# Patient Record
Sex: MALE | Race: OTHER | NOT HISPANIC OR LATINO | URBAN - METROPOLITAN AREA
[De-identification: names, ages, dates, MRNs, and addresses within clinical notes are randomized per-mention and may not be internally consistent; named-entity substitution may affect disease eponyms.]

---

## 2017-11-09 PROBLEM — Z00.00 ENCOUNTER FOR PREVENTIVE HEALTH EXAMINATION: Status: ACTIVE | Noted: 2017-11-09

## 2017-11-10 ENCOUNTER — OUTPATIENT (OUTPATIENT)
Dept: OUTPATIENT SERVICES | Facility: HOSPITAL | Age: 57
LOS: 1 days | End: 2017-11-10
Payer: COMMERCIAL

## 2017-11-10 ENCOUNTER — APPOINTMENT (OUTPATIENT)
Dept: ULTRASOUND IMAGING | Facility: CLINIC | Age: 57
End: 2017-11-10
Payer: COMMERCIAL

## 2017-11-10 DIAGNOSIS — Z00.8 ENCOUNTER FOR OTHER GENERAL EXAMINATION: ICD-10-CM

## 2017-11-10 PROCEDURE — 76770 US EXAM ABDO BACK WALL COMP: CPT

## 2017-11-10 PROCEDURE — 76770 US EXAM ABDO BACK WALL COMP: CPT | Mod: 26

## 2018-12-24 ENCOUNTER — TRANSCRIPTION ENCOUNTER (OUTPATIENT)
Age: 58
End: 2018-12-24

## 2018-12-24 ENCOUNTER — INPATIENT (INPATIENT)
Facility: HOSPITAL | Age: 58
LOS: 0 days | Discharge: HOME | DRG: 629 | End: 2018-12-25
Attending: FAMILY MEDICINE | Admitting: FAMILY MEDICINE
Payer: COMMERCIAL

## 2018-12-24 VITALS
TEMPERATURE: 98 F | WEIGHT: 162.04 LBS | HEART RATE: 58 BPM | HEIGHT: 66 IN | SYSTOLIC BLOOD PRESSURE: 144 MMHG | OXYGEN SATURATION: 100 % | RESPIRATION RATE: 18 BRPM | DIASTOLIC BLOOD PRESSURE: 71 MMHG

## 2018-12-24 DIAGNOSIS — E11.9 TYPE 2 DIABETES MELLITUS WITHOUT COMPLICATIONS: ICD-10-CM

## 2018-12-24 DIAGNOSIS — Z29.9 ENCOUNTER FOR PROPHYLACTIC MEASURES, UNSPECIFIED: ICD-10-CM

## 2018-12-24 DIAGNOSIS — E11.40 TYPE 2 DIABETES MELLITUS WITH DIABETIC NEUROPATHY, UNSPECIFIED: ICD-10-CM

## 2018-12-24 DIAGNOSIS — E87.6 HYPOKALEMIA: ICD-10-CM

## 2018-12-24 DIAGNOSIS — N18.4 CHRONIC KIDNEY DISEASE, STAGE 4 (SEVERE): ICD-10-CM

## 2018-12-24 DIAGNOSIS — I10 ESSENTIAL (PRIMARY) HYPERTENSION: ICD-10-CM

## 2018-12-24 DIAGNOSIS — I25.10 ATHEROSCLEROTIC HEART DISEASE OF NATIVE CORONARY ARTERY WITHOUT ANGINA PECTORIS: ICD-10-CM

## 2018-12-24 DIAGNOSIS — Z41.9 ENCOUNTER FOR PROCEDURE FOR PURPOSES OTHER THAN REMEDYING HEALTH STATE, UNSPECIFIED: Chronic | ICD-10-CM

## 2018-12-24 DIAGNOSIS — I77.0 ARTERIOVENOUS FISTULA, ACQUIRED: Chronic | ICD-10-CM

## 2018-12-24 DIAGNOSIS — R06.02 SHORTNESS OF BREATH: ICD-10-CM

## 2018-12-24 DIAGNOSIS — Z95.9 PRESENCE OF CARDIAC AND VASCULAR IMPLANT AND GRAFT, UNSPECIFIED: Chronic | ICD-10-CM

## 2018-12-24 LAB
ALBUMIN SERPL ELPH-MCNC: 2.8 G/DL — LOW (ref 3.3–5)
ALP SERPL-CCNC: 66 U/L — SIGNIFICANT CHANGE UP (ref 30–120)
ALT FLD-CCNC: 22 U/L DA — SIGNIFICANT CHANGE UP (ref 10–60)
ANION GAP SERPL CALC-SCNC: 13 MMOL/L — SIGNIFICANT CHANGE UP (ref 5–17)
AST SERPL-CCNC: 20 U/L — SIGNIFICANT CHANGE UP (ref 10–40)
BILIRUB SERPL-MCNC: 0.5 MG/DL — SIGNIFICANT CHANGE UP (ref 0.2–1.2)
BUN SERPL-MCNC: 62 MG/DL — HIGH (ref 7–23)
CALCIUM SERPL-MCNC: 8.9 MG/DL — SIGNIFICANT CHANGE UP (ref 8.4–10.5)
CHLORIDE SERPL-SCNC: 103 MMOL/L — SIGNIFICANT CHANGE UP (ref 96–108)
CO2 SERPL-SCNC: 26 MMOL/L — SIGNIFICANT CHANGE UP (ref 22–31)
CREAT SERPL-MCNC: 3.45 MG/DL — HIGH (ref 0.5–1.3)
GLUCOSE BLDC GLUCOMTR-MCNC: 70 MG/DL — SIGNIFICANT CHANGE UP (ref 70–99)
GLUCOSE BLDC GLUCOMTR-MCNC: 79 MG/DL — SIGNIFICANT CHANGE UP (ref 70–99)
GLUCOSE SERPL-MCNC: 84 MG/DL — SIGNIFICANT CHANGE UP (ref 70–99)
MAGNESIUM SERPL-MCNC: 2 MG/DL — SIGNIFICANT CHANGE UP (ref 1.6–2.6)
POTASSIUM SERPL-MCNC: 2.8 MMOL/L — CRITICAL LOW (ref 3.5–5.3)
POTASSIUM SERPL-SCNC: 2.8 MMOL/L — CRITICAL LOW (ref 3.5–5.3)
PROT SERPL-MCNC: 6.9 G/DL — SIGNIFICANT CHANGE UP (ref 6–8.3)
SODIUM SERPL-SCNC: 142 MMOL/L — SIGNIFICANT CHANGE UP (ref 135–145)

## 2018-12-24 PROCEDURE — 99284 EMERGENCY DEPT VISIT MOD MDM: CPT

## 2018-12-24 PROCEDURE — 71046 X-RAY EXAM CHEST 2 VIEWS: CPT | Mod: 26

## 2018-12-24 PROCEDURE — 93010 ELECTROCARDIOGRAM REPORT: CPT | Mod: 76

## 2018-12-24 PROCEDURE — 99223 1ST HOSP IP/OBS HIGH 75: CPT

## 2018-12-24 RX ORDER — DEXTROSE 50 % IN WATER 50 %
25 SYRINGE (ML) INTRAVENOUS ONCE
Qty: 0 | Refills: 0 | Status: DISCONTINUED | OUTPATIENT
Start: 2018-12-24 | End: 2018-12-25

## 2018-12-24 RX ORDER — INSULIN LISPRO 100/ML
VIAL (ML) SUBCUTANEOUS
Qty: 0 | Refills: 0 | Status: DISCONTINUED | OUTPATIENT
Start: 2018-12-24 | End: 2018-12-25

## 2018-12-24 RX ORDER — GLUCAGON INJECTION, SOLUTION 0.5 MG/.1ML
1 INJECTION, SOLUTION SUBCUTANEOUS ONCE
Qty: 0 | Refills: 0 | Status: DISCONTINUED | OUTPATIENT
Start: 2018-12-24 | End: 2018-12-25

## 2018-12-24 RX ORDER — DEXTROSE 50 % IN WATER 50 %
12.5 SYRINGE (ML) INTRAVENOUS ONCE
Qty: 0 | Refills: 0 | Status: DISCONTINUED | OUTPATIENT
Start: 2018-12-24 | End: 2018-12-25

## 2018-12-24 RX ORDER — INSULIN LISPRO 100/ML
VIAL (ML) SUBCUTANEOUS AT BEDTIME
Qty: 0 | Refills: 0 | Status: DISCONTINUED | OUTPATIENT
Start: 2018-12-24 | End: 2018-12-25

## 2018-12-24 RX ORDER — INSULIN GLARGINE 100 [IU]/ML
0 INJECTION, SOLUTION SUBCUTANEOUS
Qty: 0 | Refills: 0 | COMMUNITY

## 2018-12-24 RX ORDER — POTASSIUM CHLORIDE 20 MEQ
10 PACKET (EA) ORAL ONCE
Qty: 0 | Refills: 0 | Status: COMPLETED | OUTPATIENT
Start: 2018-12-24 | End: 2018-12-24

## 2018-12-24 RX ORDER — SODIUM CHLORIDE 9 MG/ML
1000 INJECTION, SOLUTION INTRAVENOUS
Qty: 0 | Refills: 0 | Status: DISCONTINUED | OUTPATIENT
Start: 2018-12-24 | End: 2018-12-25

## 2018-12-24 RX ORDER — DEXTROSE 50 % IN WATER 50 %
15 SYRINGE (ML) INTRAVENOUS ONCE
Qty: 0 | Refills: 0 | Status: DISCONTINUED | OUTPATIENT
Start: 2018-12-24 | End: 2018-12-25

## 2018-12-24 RX ORDER — GABAPENTIN 400 MG/1
0 CAPSULE ORAL
Qty: 0 | Refills: 0 | COMMUNITY

## 2018-12-24 RX ORDER — ROSUVASTATIN CALCIUM 5 MG/1
0 TABLET ORAL
Qty: 0 | Refills: 0 | COMMUNITY

## 2018-12-24 RX ORDER — POTASSIUM CHLORIDE 20 MEQ
40 PACKET (EA) ORAL ONCE
Qty: 0 | Refills: 0 | Status: COMPLETED | OUTPATIENT
Start: 2018-12-24 | End: 2018-12-24

## 2018-12-24 RX ORDER — INSULIN GLARGINE 100 [IU]/ML
20 INJECTION, SOLUTION SUBCUTANEOUS ONCE
Qty: 0 | Refills: 0 | Status: COMPLETED | OUTPATIENT
Start: 2018-12-24 | End: 2018-12-24

## 2018-12-24 RX ORDER — INSULIN GLARGINE 100 [IU]/ML
40 INJECTION, SOLUTION SUBCUTANEOUS AT BEDTIME
Qty: 0 | Refills: 0 | Status: DISCONTINUED | OUTPATIENT
Start: 2018-12-24 | End: 2018-12-25

## 2018-12-24 RX ADMIN — Medication 40 MILLIEQUIVALENT(S): at 18:42

## 2018-12-24 RX ADMIN — Medication 40 MILLIEQUIVALENT(S): at 23:16

## 2018-12-24 RX ADMIN — SODIUM CHLORIDE 50 MILLILITER(S): 9 INJECTION, SOLUTION INTRAVENOUS at 15:18

## 2018-12-24 RX ADMIN — SODIUM CHLORIDE 50 MILLILITER(S): 9 INJECTION, SOLUTION INTRAVENOUS at 19:48

## 2018-12-24 RX ADMIN — INSULIN GLARGINE 20 UNIT(S): 100 INJECTION, SOLUTION SUBCUTANEOUS at 23:25

## 2018-12-24 RX ADMIN — Medication 100 MILLIEQUIVALENT(S): at 14:40

## 2018-12-24 RX ADMIN — Medication 100 MILLIEQUIVALENT(S): at 18:49

## 2018-12-24 RX ADMIN — Medication 10 MILLIEQUIVALENT(S): at 16:50

## 2018-12-24 NOTE — ED PROVIDER NOTE - CARE PLAN
Principal Discharge DX:	Hypokalemia  Secondary Diagnosis:	Retinal detachment of left eye without retinal defect

## 2018-12-24 NOTE — H&P ADULT - PROBLEM SELECTOR PLAN 5
Stable, last nuclear stress test was in august 2018, no perfusion defect, will continue on metoprolol as Nebivolol is non formulary, in addition to Aspirin & statin. Cardiology consult as stated above.

## 2018-12-24 NOTE — H&P ADULT - ASSESSMENT
57 y/o M with PMH of HTN, DM type 2 with Diabetic Neuropathy, Dyslipidemia, CAD s/p MI s/p PCI 10 years ago s/p CABG, Stage 4 CKD s/p left A-V fistula placement, and testicular Dysfunction who presented with pre-op low plasma potassium level. 59 y/o M with PMH of HTN, DM type 2 with Diabetic Neuropathy, Dyslipidemia, CAD s/p MI s/p PCI 10 years ago s/p CABG, Stage 4 CKD s/p left A-V fistula placement, and testicular Dysfunction who presented with pre-op low serum potassium level.

## 2018-12-24 NOTE — ED ADULT NURSE NOTE - CHPI ED NUR SYMPTOMS NEG
no headache/no pain/no vomiting/no dizziness/no fever/no loss of consciousness/no nausea/no back pain/no chills/no decreased eating/drinking

## 2018-12-24 NOTE — H&P ADULT - ATTENDING COMMENTS
Medical management plan was discussed with patient at the bedside, he understands & agrees. Medical management plan was discussed with patient at the bedside, he understands & agrees. Patient is medically optimized for low risk surgery pending am labs, can proceed with retinal detachment repair after cardiology clearance.

## 2018-12-24 NOTE — H&P ADULT - PROBLEM SELECTOR PLAN 4
s/p post AV fistula, already matured, already on transplant list, didn't start HD, will monitor, renal dosing of meds, avoid nephrotoxins.

## 2018-12-24 NOTE — H&P ADULT - HISTORY OF PRESENT ILLNESS
This is a 57 y/o M with PMH of HTN, DM type 2 with Diabetic Neuropathy, Dyslipidemia, CAD s/p MI s/p PCI 10 years ago s/p CABG, Stage 4 CKD s/p left A-V fistula placement, and testicular Dysfunction who presented with low plasma potassium level. As per patient, he had acute diminution of his left eye vision 2 days ago, saw his ophthalmologist today who scheduled him for same day for surgical repair of his left retinal Detachment. Patient saw his PMD today who cleared him for the surgery after an EKG was done, no blood work, but the anesthesiologist ordered BMP, given his kidney problem, that revealed hypokalemia of 2.6 meq/L, so surgery was rescheduled, and patient was sent to ED for admission to the hospital for correction of hypokalemia, and monitoring of his electrolytes. Patient denies any palpitations, no chest pain, muscle cramps, or dizziness, but reports chronic SOB on mild effort for which he was on Lasix 80 mg PO BID for long time.

## 2018-12-24 NOTE — ED PROVIDER NOTE - OBJECTIVE STATEMENT
59 y/o M pt with hx of DM, renal failure, MI, HTN, CAD, and CABG presents to the ED c/o low potassium levels today. He states that he was having blurry vision for 3 days. Pt was seen by his ophthalmologist this morning and was diagnosed with detached retina in the left eye. Pt was scheduled for a retinal detachment repair surgery today and was sent to the ED for further evaluation. The surgery has been rescheduled as per the staff in the OR. He had his medical clearance at his pmd this morning and blood work done at the hospital. Pt has renal failure, but has not started dialysis yet. Denies fever, chills, or chest pain. No other complaints at this time.     Dr. Alex Christopher- ophthalmologist  Dr. sultana jo- pmd 57 y/o M pt with hx of DM, renal failure, MI, HTN, CAD, and CABG presents to the ED c/o low potassium levels today. He states that he was having blurry vision for 3 days. Pt was seen by his ophthalmologist this morning and was diagnosed with detached retina in the left eye. Pt was scheduled for a retinal detachment repair surgery today and was sent to the ED for further evaluation. The surgery has been rescheduled as per the staff in the OR. He had his medical clearance at his pmd this morning and blood work done at the hospital. Pt has renal failure, but has not started dialysis yet. Denies fever, chills, or chest pain. No other complaints at this time.     Dr. Alex Banks- ophthalmologist  Dr. sultana jo- pmd

## 2018-12-24 NOTE — H&P ADULT - NSHPLABSRESULTS_GEN_ALL_CORE
-             12-24    142  |  103  |  62<H>  ----------------------------<  84  2.8<LL>   |  26  |  3.45<H>    Ca    8.9      24 Dec 2018 17:49  Mg     2.0     12-24    TPro  6.9  /  Alb  2.8<L>  /  TBili  0.5  /  DBili  x   /  AST  20  /  ALT  22  /  AlkPhos  66  12-24            CAPILLARY BLOOD GLUCOSE  POCT Blood Glucose.: 200 mg/dL (24 Dec 2018 19:53)        CXR:    As per my review shows s/p mid sternotomy, enlarged cardiac shadow size, pulmonary venous congestion, no pulmonary infiltrates, pleural effusion, or pneumothorax. Pending official report.          EKG:    As per my review shows SR at 75/min, normal CA & QTc intervals, possible old anterior MI, normal QRS voltage, duration, and axis (+90), with delayed transition, lateral leads ST-T abnormality.  No change from previous EKG done this am.    -

## 2018-12-24 NOTE — H&P ADULT - NSHPPHYSICALEXAM_GEN_ALL_CORE
-    Vital Signs Last 24 Hrs  T(C): 36.7 (24 Dec 2018 19:48), Max: 36.7 (24 Dec 2018 19:48)  T(F): 98 (24 Dec 2018 19:48), Max: 98 (24 Dec 2018 19:48)  HR: 67 (24 Dec 2018 19:48) (58 - 67)  BP: 152/70 (24 Dec 2018 19:48) (134/78 - 152/70)  BP(mean): --  RR: 18 (24 Dec 2018 19:48) (16 - 18)  SpO2: 99% (24 Dec 2018 19:48) (99% - 100%)          PHYSICAL EXAM:    GENERAL: NAD, well-groomed, well-developed.  HEAD:  Atraumatic, Norm cephalic.  EYES: B/L mydiasis, (+) B/L IO lens, conjunctiva clear.  ENMT: no nasal discharge, no juan-pharyngeal erythema or exudates, MMM.   NECK: Supple, No JVD.  NERVOUS SYSTEM:  Alert & oriented X3, neurologically intact grossly.  CHEST/LUNG: Fair air entry B/L, (+) B/L lower lung zone rales, no rhonchi, or wheezing.  HEART: Normal S1 & S2, grad I/VI DONNA over 1st aortic area without propagation, no extra sounds.  ABDOMEN: Soft, non-tender, non-distended; bowel sounds present, no palpable masses or organomegaly.  EXTREMITIES:  No clubbing, or cyanosis, (+) mild B/L ankle edema, (+) left radial AV fistula.  VASCULAR: 2+ radial, DPA / PTA pulses B/L.  SKIN: No rashes or lesions.  PSYCH: normal affect & behavior.

## 2018-12-24 NOTE — H&P ADULT - PROBLEM SELECTOR PLAN 2
with CXR shows cardiomegaly & PVC, will continue Furosemide, cardiology consult with Dr. Dillon was called.

## 2018-12-24 NOTE — ED PROVIDER NOTE - PSH
A-V fistula  Left upper arm  Elective surgery  continous glucose monitor right upper arm  S/P angioplasty with stent  x 2 stents

## 2018-12-24 NOTE — ED PROVIDER NOTE - PMH
CAD (coronary artery disease)    CKD (chronic kidney disease), stage IV    Diabetic neuropathy    DM (diabetes mellitus)    HTN (hypertension)    Myocardial infarction    Testicular hypofunction

## 2018-12-24 NOTE — H&P ADULT - PROBLEM SELECTOR PLAN 1
Potassium of 2.6 meq/L this am, ML 2ry to high dose loop diuretic that patient is on for long without potassium supplements, no hypokalemia related EKG changes, received 10 meq IVPB X1 dose earlier by ED team, repeat potassium was the same, received another dose in addition to 40 meq of potassium chloride X1 PO, will give another dose of 40 meq PO X1 at 11 pm, and res test serum potassium level in am.

## 2018-12-24 NOTE — H&P ADULT - PROBLEM SELECTOR PLAN 3
controlled, patient is going to be NPO status after midnight, will give only 20 units of Lantus for tonight, then resume the home dose equivalent starting tomorrow at bedtime, in addition to corrective insulin Lispro coverage scale before meals & at bedtime, will check glycohemoglobin level in am.

## 2018-12-24 NOTE — H&P ADULT - NSHPREVIEWOFSYSTEMS_GEN_ALL_CORE
-    CONSTITUTIONAL: No fever, weight loss, or fatigue.  EYES: No eye pain, (+) diminution of his left eye vision, no discharge.  ENMT:  No difficulty hearing, tinnitus, vertigo; No sinus or throat pain.  NECK: No pain or stiffness.  RESPIRATORY: No cough, wheezing, or hemoptysis; (+)  shortness of breath on mild effort.  CARDIOVASCULAR: No chest pain, palpitations, or dizziness, (+) B/L leg swelling.  GASTROINTESTINAL: No abdominal pain, no nausea, vomiting, or hematemesis; No diarrhea or Change in bowel habits. No melena or hematochezia.  GENITOURINARY: No dysuria, frequency, hematuria, or incontinence.  NEUROLOGICAL: No headaches, focal muscle weakness, numbness, or tremors.  SKIN: No itching, burning or rashes.  MUSCULOSKELETAL: No joint swelling or pain.  PSYCHIATRIC: No depression, anxiety, or agitation.  HEME/LYMPH: No easy bruising, bleeding gums, or nose bleed.  ALLERGY AND IMMUNOLOGIC: No hives or eczema.

## 2018-12-25 ENCOUNTER — TRANSCRIPTION ENCOUNTER (OUTPATIENT)
Age: 58
End: 2018-12-25

## 2018-12-25 VITALS
SYSTOLIC BLOOD PRESSURE: 157 MMHG | TEMPERATURE: 98 F | HEART RATE: 63 BPM | RESPIRATION RATE: 18 BRPM | DIASTOLIC BLOOD PRESSURE: 85 MMHG | OXYGEN SATURATION: 98 %

## 2018-12-25 LAB
ANION GAP SERPL CALC-SCNC: 12 MMOL/L — SIGNIFICANT CHANGE UP (ref 5–17)
BASOPHILS # BLD AUTO: 0.06 K/UL — SIGNIFICANT CHANGE UP (ref 0–0.2)
BASOPHILS NFR BLD AUTO: 0.8 % — SIGNIFICANT CHANGE UP (ref 0–2)
BUN SERPL-MCNC: 64 MG/DL — HIGH (ref 7–23)
CALCIUM SERPL-MCNC: 9.4 MG/DL — SIGNIFICANT CHANGE UP (ref 8.4–10.5)
CHLORIDE SERPL-SCNC: 107 MMOL/L — SIGNIFICANT CHANGE UP (ref 96–108)
CO2 SERPL-SCNC: 24 MMOL/L — SIGNIFICANT CHANGE UP (ref 22–31)
CREAT SERPL-MCNC: 3.46 MG/DL — HIGH (ref 0.5–1.3)
EOSINOPHIL # BLD AUTO: 0.31 K/UL — SIGNIFICANT CHANGE UP (ref 0–0.5)
EOSINOPHIL NFR BLD AUTO: 4 % — SIGNIFICANT CHANGE UP (ref 0–6)
GLUCOSE SERPL-MCNC: 126 MG/DL — HIGH (ref 70–99)
HBA1C BLD-MCNC: 9.5 % — HIGH (ref 4–5.6)
HCT VFR BLD CALC: 33.5 % — LOW (ref 39–50)
HCV AB S/CO SERPL IA: 0.09 S/CO — SIGNIFICANT CHANGE UP
HCV AB SERPL-IMP: SIGNIFICANT CHANGE UP
HGB BLD-MCNC: 10.6 G/DL — LOW (ref 13–17)
IMM GRANULOCYTES NFR BLD AUTO: 0.3 % — SIGNIFICANT CHANGE UP (ref 0–1.5)
LYMPHOCYTES # BLD AUTO: 1.74 K/UL — SIGNIFICANT CHANGE UP (ref 1–3.3)
LYMPHOCYTES # BLD AUTO: 22.7 % — SIGNIFICANT CHANGE UP (ref 13–44)
MCHC RBC-ENTMCNC: 27.2 PG — SIGNIFICANT CHANGE UP (ref 27–34)
MCHC RBC-ENTMCNC: 31.6 GM/DL — LOW (ref 32–36)
MCV RBC AUTO: 85.9 FL — SIGNIFICANT CHANGE UP (ref 80–100)
MONOCYTES # BLD AUTO: 0.63 K/UL — SIGNIFICANT CHANGE UP (ref 0–0.9)
MONOCYTES NFR BLD AUTO: 8.2 % — SIGNIFICANT CHANGE UP (ref 2–14)
NEUTROPHILS # BLD AUTO: 4.91 K/UL — SIGNIFICANT CHANGE UP (ref 1.8–7.4)
NEUTROPHILS NFR BLD AUTO: 64 % — SIGNIFICANT CHANGE UP (ref 43–77)
NT-PROBNP SERPL-SCNC: 6014 PG/ML — HIGH (ref 0–125)
PLATELET # BLD AUTO: 224 K/UL — SIGNIFICANT CHANGE UP (ref 150–400)
POTASSIUM SERPL-MCNC: 3.9 MMOL/L — SIGNIFICANT CHANGE UP (ref 3.5–5.3)
POTASSIUM SERPL-SCNC: 3.9 MMOL/L — SIGNIFICANT CHANGE UP (ref 3.5–5.3)
RBC # BLD: 3.9 M/UL — LOW (ref 4.2–5.8)
RBC # FLD: 17.1 % — HIGH (ref 10.3–14.5)
SODIUM SERPL-SCNC: 143 MMOL/L — SIGNIFICANT CHANGE UP (ref 135–145)
WBC # BLD: 7.67 K/UL — SIGNIFICANT CHANGE UP (ref 3.8–10.5)
WBC # FLD AUTO: 7.67 K/UL — SIGNIFICANT CHANGE UP (ref 3.8–10.5)

## 2018-12-25 PROCEDURE — 80048 BASIC METABOLIC PNL TOTAL CA: CPT

## 2018-12-25 PROCEDURE — 86803 HEPATITIS C AB TEST: CPT

## 2018-12-25 PROCEDURE — C1889: CPT

## 2018-12-25 PROCEDURE — 82962 GLUCOSE BLOOD TEST: CPT

## 2018-12-25 PROCEDURE — 85027 COMPLETE CBC AUTOMATED: CPT

## 2018-12-25 PROCEDURE — 99285 EMERGENCY DEPT VISIT HI MDM: CPT | Mod: 25

## 2018-12-25 PROCEDURE — 83880 ASSAY OF NATRIURETIC PEPTIDE: CPT

## 2018-12-25 PROCEDURE — 99239 HOSP IP/OBS DSCHRG MGMT >30: CPT

## 2018-12-25 PROCEDURE — 83735 ASSAY OF MAGNESIUM: CPT

## 2018-12-25 PROCEDURE — 93005 ELECTROCARDIOGRAM TRACING: CPT

## 2018-12-25 PROCEDURE — 83036 HEMOGLOBIN GLYCOSYLATED A1C: CPT

## 2018-12-25 PROCEDURE — 96365 THER/PROPH/DIAG IV INF INIT: CPT

## 2018-12-25 PROCEDURE — 71046 X-RAY EXAM CHEST 2 VIEWS: CPT

## 2018-12-25 PROCEDURE — 80053 COMPREHEN METABOLIC PANEL: CPT

## 2018-12-25 PROCEDURE — 36415 COLL VENOUS BLD VENIPUNCTURE: CPT

## 2018-12-25 RX ORDER — FUROSEMIDE 40 MG
1 TABLET ORAL
Qty: 0 | Refills: 0 | COMMUNITY

## 2018-12-25 RX ORDER — ASPIRIN/CALCIUM CARB/MAGNESIUM 324 MG
1 TABLET ORAL
Qty: 0 | Refills: 0 | COMMUNITY
Start: 2018-12-25

## 2018-12-25 RX ORDER — ASPIRIN/CALCIUM CARB/MAGNESIUM 324 MG
81 TABLET ORAL DAILY
Qty: 0 | Refills: 0 | Status: DISCONTINUED | OUTPATIENT
Start: 2018-12-25 | End: 2018-12-25

## 2018-12-25 RX ORDER — ATORVASTATIN CALCIUM 80 MG/1
40 TABLET, FILM COATED ORAL AT BEDTIME
Qty: 0 | Refills: 0 | Status: DISCONTINUED | OUTPATIENT
Start: 2018-12-25 | End: 2018-12-25

## 2018-12-25 RX ORDER — GABAPENTIN 400 MG/1
300 CAPSULE ORAL
Qty: 0 | Refills: 0 | Status: DISCONTINUED | OUTPATIENT
Start: 2018-12-25 | End: 2018-12-25

## 2018-12-25 RX ORDER — METOPROLOL TARTRATE 50 MG
25 TABLET ORAL
Qty: 0 | Refills: 0 | Status: DISCONTINUED | OUTPATIENT
Start: 2018-12-25 | End: 2018-12-25

## 2018-12-25 RX ORDER — ONDANSETRON 8 MG/1
4 TABLET, FILM COATED ORAL ONCE
Qty: 0 | Refills: 0 | Status: COMPLETED | OUTPATIENT
Start: 2018-12-25 | End: 2018-12-25

## 2018-12-25 RX ORDER — FERROUS SULFATE 325(65) MG
1 TABLET ORAL
Qty: 0 | Refills: 0 | COMMUNITY
Start: 2018-12-25

## 2018-12-25 RX ORDER — FERROUS SULFATE 325(65) MG
325 TABLET ORAL DAILY
Qty: 0 | Refills: 0 | Status: DISCONTINUED | OUTPATIENT
Start: 2018-12-25 | End: 2018-12-25

## 2018-12-25 RX ORDER — FUROSEMIDE 40 MG
80 TABLET ORAL
Qty: 0 | Refills: 0 | Status: DISCONTINUED | OUTPATIENT
Start: 2018-12-25 | End: 2018-12-25

## 2018-12-25 RX ORDER — NEBIVOLOL HYDROCHLORIDE 5 MG/1
1 TABLET ORAL
Qty: 0 | Refills: 0 | COMMUNITY

## 2018-12-25 RX ADMIN — ONDANSETRON 4 MILLIGRAM(S): 8 TABLET, FILM COATED ORAL at 11:40

## 2018-12-25 RX ADMIN — Medication 80 MILLIGRAM(S): at 05:29

## 2018-12-25 RX ADMIN — Medication 25 MILLIGRAM(S): at 05:29

## 2018-12-25 RX ADMIN — Medication 81 MILLIGRAM(S): at 13:11

## 2018-12-25 RX ADMIN — Medication 325 MILLIGRAM(S): at 13:11

## 2018-12-25 RX ADMIN — GABAPENTIN 300 MILLIGRAM(S): 400 CAPSULE ORAL at 07:40

## 2018-12-25 NOTE — DISCHARGE NOTE ADULT - ADDITIONAL INSTRUCTIONS
Please follow up with opth in am for re-evaluation  Please follow up with nephrology in 2-3 days  Please come back to the hospital if you develop any shortness of breath, chest pain, or any new symptoms or concerns

## 2018-12-25 NOTE — DISCHARGE NOTE ADULT - PATIENT PORTAL LINK FT
You can access the [facility] Patient Portal, offered by [facility], by registering with the following website: [email or web address]

## 2018-12-25 NOTE — DISCHARGE NOTE ADULT - MEDICATION SUMMARY - MEDICATIONS TO CHANGE
96 I will SWITCH the dose or number of times a day I take the medications listed below when I get home from the hospital:  None

## 2018-12-25 NOTE — DISCHARGE NOTE ADULT - SECONDARY DIAGNOSIS.
CAD (coronary artery disease) CKD (chronic kidney disease), stage IV DM2 (diabetes mellitus, type 2)

## 2018-12-25 NOTE — PHARMACOTHERAPY INTERVENTION NOTE - COMMENTS
Recommended changing gabapentin from 600mg bid to 600mg once daily due to CrCl~23.  MD Torres reduced dose to 300mg bid
Suggested reducing intial Lantus Dose 80 percent from 50 units to 40 units because it was patients first dose after being admitted and they were being converted for Toujeo

## 2018-12-25 NOTE — DISCHARGE NOTE ADULT - CARE PLAN
Principal Discharge DX:	Retinal detachment, unspecified laterality  Goal:	keep head down for the next 24hrs.  follow up with opth in the am  Assessment and plan of treatment:	diabetic diet  Secondary Diagnosis:	CAD (coronary artery disease)  Goal:	continue with home medications  Secondary Diagnosis:	CKD (chronic kidney disease), stage IV  Goal:	Stable  Secondary Diagnosis:	DM2 (diabetes mellitus, type 2)  Goal:	Continue with home medications

## 2018-12-25 NOTE — DISCHARGE NOTE ADULT - HOSPITAL COURSE
57 y/o M with PMH of HTN, DM type 2 with Diabetic Neuropathy, Dyslipidemia, CAD s/p MI s/p PCI 10 years ago s/p CABG, Stage 4 CKD s/p left A-V fistula placement, and testicular Dysfunction who presented with pre-op low serum potassium level.      Problem/Plan - 1:  ·  Problem: Hypokalemia.  Resolved.    -Follow up with Nephrology in 3-4 days for re-evaluation     Problem/Plan - 2:  ·  Problem: SOB (shortness of breath).  Plan: with CXR shows cardiomegaly No acute process.  Asymptomatic.  Resolved.  Outpatient follow up with cardiology.  Discussed case with cardiology who recommended outpatient follow up with his cardiologist.      Problem/Plan - 3:  ·  Problem: DM2 (diabetes mellitus, type 2).  Continue with home medications        Problem/Plan - 4:  ·  Problem: CKD (chronic kidney disease), stage IV.  Plan: s/p post AV fistula, already matured, already on transplant list, didn't start HD, will monitor, renal dosing of meds, avoid nephrotoxins. outpatient follow up with nephrology     Problem/Plan - 5:  ·  Problem: CAD (coronary artery disease).  Plan: Stable, last nuclear stress test was in august 2018, no perfusion defect, will continue on metoprolol as Nebivolol is non formulary, in addition to Aspirin & statin. Cleared for discharge as per cardiology.  Outpatient follow up        Problem/Plan - 6:  Problem: HTN (hypertension). Plan: controlled, will continue same meds with hold parameters.     Problem/Plan - 7:  ·  Problem: Diabetic neuropathy.  Plan: on Gabapentin 600 mg PO BID, will continue.     Hospital course, and discharge planning were discussed with patient, and wife in details.  Seems to understand, and in agreement      Patient was seen and examined today  Patient reports that he feels better.  Offers no other complaints  Denies any SOB, LE, chest pain, Headache, dizziness, weakness, numbness, abdominal pain, N/V/D    Vital Signs Last 24 Hrs  T(C): 36.7 (25 Dec 2018 10:46), Max: 37 (24 Dec 2018 22:35)  T(F): 98 (25 Dec 2018 10:46), Max: 98.6 (24 Dec 2018 22:35)  HR: 66 (25 Dec 2018 11:42) (58 - 77)  BP: 159/85 (25 Dec 2018 11:42) (134/78 - 160/85)  BP(mean): --  RR: 18 (25 Dec 2018 11:42) (15 - 20)  SpO2: 97% (25 Dec 2018 11:42) (96% - 100%)    PHYSICAL EXAM-  GENERAL: NAD, chronic ill appearing male  HEAD:  Atraumatic, Normocephalic  EYES: EOMI, PERRLA, conjunctiva and sclera clear  NECK: Supple, No JVD, Normal thyroid  NERVOUS SYSTEM:  Alert & Oriented X3, Motor Strength 5/5 B/L upper and lower extremities; DTRs 2+ intact and symmetric  CHEST/LUNG: Clear to percussion bilaterally; No rales, rhonchi, wheezing, or rubs  HEART: Regular rate and rhythm; No murmurs, rubs, or gallops  ABDOMEN: Soft, Nontender, Nondistended; Bowel sounds present  EXTREMITIES:  2+ Peripheral Pulses, No clubbing, cyanosis, or edema  SKIN: No rashes or lesions

## 2018-12-25 NOTE — DISCHARGE NOTE ADULT - PLAN OF CARE
keep head down for the next 24hrs.  follow up with opth in the am diabetic diet continue with home medications Stable Continue with home medications

## 2018-12-25 NOTE — DISCHARGE NOTE ADULT - CARE PROVIDER_API CALL
Alex Banks (MD), Ophthalmology  [address] [place] [address] [id number]  Phone: [phone]  Fax: [phone]

## 2018-12-25 NOTE — DISCHARGE NOTE ADULT - MEDICATION SUMMARY - MEDICATIONS TO TAKE
I will START or STAY ON the medications listed below when I get home from the hospital:    Adult Aspirin 81 mg oral tablet, chewable  -- 1 tab(s) by mouth once a day  -- Indication: For CAD (coronary artery disease)    gabapentin 600 mg oral tablet  -- orally 2 times a day  -- Indication: For Neuropathy    Touhaleyo Max SoloStar  -- 50 units at night  -- Indication: For DM2 (diabetes mellitus, type 2)    Crestor  -- Indication: For Hld    Bystolic 10 mg oral tablet  -- 1 tab(s) by mouth once a day  -- Indication: For HTN (hypertension)    Lasix 80 mg oral tablet  -- 1 tab(s) by mouth 2 times a day  -- Indication: For Chf    ferrous sulfate 325 mg (65 mg elemental iron) oral tablet  -- 1 tab(s) by mouth once a day  -- Indication: For anemia

## 2021-03-20 ENCOUNTER — TRANSCRIPTION ENCOUNTER (OUTPATIENT)
Age: 61
End: 2021-03-20

## 2021-08-23 ENCOUNTER — TRANSCRIPTION ENCOUNTER (OUTPATIENT)
Age: 61
End: 2021-08-23

## 2022-01-02 ENCOUNTER — TRANSCRIPTION ENCOUNTER (OUTPATIENT)
Age: 62
End: 2022-01-02

## 2022-03-18 PROBLEM — E11.40 TYPE 2 DIABETES MELLITUS WITH DIABETIC NEUROPATHY, UNSPECIFIED: Chronic | Status: ACTIVE | Noted: 2018-12-24

## 2022-03-18 PROBLEM — N18.4 CHRONIC KIDNEY DISEASE, STAGE 4 (SEVERE): Chronic | Status: ACTIVE | Noted: 2018-12-24

## 2022-03-18 PROBLEM — E11.9 TYPE 2 DIABETES MELLITUS WITHOUT COMPLICATIONS: Chronic | Status: ACTIVE | Noted: 2018-12-24

## 2022-03-18 PROBLEM — I21.9 ACUTE MYOCARDIAL INFARCTION, UNSPECIFIED: Chronic | Status: ACTIVE | Noted: 2018-12-24

## 2022-03-18 PROBLEM — I25.10 ATHEROSCLEROTIC HEART DISEASE OF NATIVE CORONARY ARTERY WITHOUT ANGINA PECTORIS: Chronic | Status: ACTIVE | Noted: 2018-12-24

## 2022-03-18 PROBLEM — I10 ESSENTIAL (PRIMARY) HYPERTENSION: Chronic | Status: ACTIVE | Noted: 2018-12-24

## 2022-03-18 PROBLEM — E29.1 TESTICULAR HYPOFUNCTION: Chronic | Status: ACTIVE | Noted: 2018-12-24

## 2022-03-21 PROBLEM — Z71.9 ENCOUNTER FOR CONSULTATION: Status: ACTIVE | Noted: 2022-03-21

## 2022-03-22 RX ORDER — FUROSEMIDE 40 MG/1
40 TABLET ORAL
Refills: 0 | Status: ACTIVE | COMMUNITY
Start: 2022-03-22

## 2022-03-22 RX ORDER — TRAZODONE HYDROCHLORIDE 50 MG/1
50 TABLET ORAL DAILY
Refills: 0 | Status: ACTIVE | COMMUNITY
Start: 2022-03-22

## 2022-03-22 RX ORDER — TAMSULOSIN HYDROCHLORIDE 0.4 MG/1
0.4 CAPSULE ORAL DAILY
Refills: 0 | Status: ACTIVE | COMMUNITY
Start: 2022-03-22

## 2022-03-22 RX ORDER — INSULIN ASPART 100 [IU]/ML
100 INJECTION, SOLUTION INTRAVENOUS; SUBCUTANEOUS DAILY
Refills: 0 | Status: ACTIVE | COMMUNITY
Start: 2022-03-22

## 2022-03-22 RX ORDER — FLUDROCORTISONE ACETATE 0.1 MG/1
0.1 TABLET ORAL
Refills: 0 | Status: ACTIVE | COMMUNITY
Start: 2022-03-22

## 2022-03-22 RX ORDER — CLOPIDOGREL 75 MG/1
75 TABLET, FILM COATED ORAL DAILY
Qty: 30 | Refills: 0 | Status: ACTIVE | COMMUNITY
Start: 2022-03-22

## 2022-03-22 RX ORDER — ROSUVASTATIN CALCIUM 20 MG/1
20 TABLET, FILM COATED ORAL DAILY
Qty: 30 | Refills: 2 | Status: ACTIVE | COMMUNITY
Start: 2022-03-22

## 2022-03-22 RX ORDER — ASPIRIN 81 MG/1
81 TABLET, COATED ORAL
Refills: 0 | Status: ACTIVE | COMMUNITY
Start: 2022-03-22

## 2022-03-22 RX ORDER — RIVAROXABAN 2.5 MG/1
2.5 TABLET, FILM COATED ORAL
Refills: 0 | Status: ACTIVE | COMMUNITY
Start: 2022-03-22

## 2022-03-22 RX ORDER — BLOOD SUGAR DIAGNOSTIC
100 STRIP MISCELLANEOUS
Refills: 0 | Status: ACTIVE | COMMUNITY
Start: 2022-03-22

## 2022-03-24 ENCOUNTER — NON-APPOINTMENT (OUTPATIENT)
Age: 62
End: 2022-03-24

## 2022-03-24 ENCOUNTER — APPOINTMENT (OUTPATIENT)
Dept: CARDIOTHORACIC SURGERY | Facility: CLINIC | Age: 62
End: 2022-03-24
Payer: MEDICARE

## 2022-03-24 VITALS
BODY MASS INDEX: 21.05 KG/M2 | HEIGHT: 68 IN | SYSTOLIC BLOOD PRESSURE: 102 MMHG | WEIGHT: 138.89 LBS | TEMPERATURE: 98 F | HEART RATE: 90 BPM | RESPIRATION RATE: 14 BRPM | OXYGEN SATURATION: 97 % | DIASTOLIC BLOOD PRESSURE: 65 MMHG

## 2022-03-24 DIAGNOSIS — T14.8XXA OTHER INJURY OF UNSPECIFIED BODY REGION, INITIAL ENCOUNTER: ICD-10-CM

## 2022-03-24 DIAGNOSIS — Z78.9 OTHER SPECIFIED HEALTH STATUS: ICD-10-CM

## 2022-03-24 DIAGNOSIS — Z71.9 COUNSELING, UNSPECIFIED: ICD-10-CM

## 2022-03-24 DIAGNOSIS — Z83.3 FAMILY HISTORY OF DIABETES MELLITUS: ICD-10-CM

## 2022-03-24 DIAGNOSIS — Z87.39 PERSONAL HISTORY OF OTHER DISEASES OF THE MUSCULOSKELETAL SYSTEM AND CONNECTIVE TISSUE: ICD-10-CM

## 2022-03-24 DIAGNOSIS — Z86.79 PERSONAL HISTORY OF OTHER DISEASES OF THE CIRCULATORY SYSTEM: ICD-10-CM

## 2022-03-24 DIAGNOSIS — Z87.891 PERSONAL HISTORY OF NICOTINE DEPENDENCE: ICD-10-CM

## 2022-03-24 PROCEDURE — 99214 OFFICE O/P EST MOD 30 MIN: CPT

## 2022-03-24 NOTE — CONSULT LETTER
[Dear  ___] : Dear ~JOCELYN, [Courtesy Letter:] : I had the pleasure of seeing your patient, [unfilled], in my office today. [Please see my note below.] : Please see my note below. [Consult Closing:] : Thank you very much for allowing me to participate in the care of this patient.  If you have any questions, please do not hesitate to contact me. [Sincerely,] : Sincerely, [FreeTextEntry2] : Joni Padilla D.O.\[facility] [address] [place] [address]\[place] [id number] [FreeTextEntry3] : Santiago Sandoval MD\par  & \par \par Cardiovascular & Thoracic Surgery\par [facility] \par [address]\par [facility] [address]\par

## 2022-03-24 NOTE — HISTORY OF PRESENT ILLNESS
[FreeTextEntry1] : Mr. Murcia is a 61 year old male former smoker with a past medical history of CAD (s/p CABG x 3 in 2007), ESRD on HD, HTN, HLD and IDDM. He was seen urgently on 3/16/2022 due to patient not feeling well due to hypotensive episodes. He has required more frequent paracentesis that he and his wife have noted initially improved with coronary revascularization and that Dr. Al and Dr. Montgomery both feel are the cause of recurrent ascites. He is a dialysis patient. Seen in the ER on 3/11/2022 due to ascites s/p paracentesis by Dr. Thurman with 5 L removed. He was also found to have severe PAD of bilateral femoral arteries and he underwent a revascularization  for no healing ulcers of his heels and claudications symptoms. He reports dyspnea on exertion when walking although patient has bilateral ulcers on his heels and toes therefore he does not ambulate a lot. He is unable to lie flat in bed and is unable sleep. His appetite is poor and he reports making a small amount of urine daily. He is visibly weak today and requires assistance to pivot from wheelchair to exam table. He denies angina or palpitations. He feels dizzy when his blood pressure drops.

## 2022-03-24 NOTE — PHYSICAL EXAM
[General Appearance - Alert] : alert [General Appearance - In No Acute Distress] : in no acute distress [] : no respiratory distress [Examination Of The Chest] : the chest was normal in appearance [No Focal Deficits] : no focal deficits [Oriented To Time, Place, And Person] : oriented to person, place, and time [FreeTextEntry1] : distended  [No CVA Tenderness] : no ~M costovertebral angle tenderness

## 2022-03-24 NOTE — REVIEW OF SYSTEMS
[Feeling Tired] : feeling tired [Eyesight Problems] : eyesight problems [Lower Ext Edema] : lower extremity edema [SOB on Exertion] : shortness of breath during exertion [Joint Swelling] : joint swelling [Joint Stiffness] : joint stiffness [Skin Wound] : skin wound [Limb Weakness] : limb weakness [Easy Bleeding] : a tendency for easy bleeding [Negative] : Endocrine [Dizziness] : no dizziness [FreeTextEntry7] : + ascites

## 2022-03-24 NOTE — DATA REVIEWED
[FreeTextEntry1] : Cardiac Catherization (1/22/2021)\par Triple vessel CAD (RCA, LAD, LCx)\par Patent LIMA to LAD mid distal anastomosis \par Occluded SVG to LAD 1- prox anastomosis\par Occluded SVg to LCx to OM1 anastomosis \par In stent restenosis of LAD (mid)\par Unsuccessful intervention of prox RCA and LCx branch LPDA

## 2022-03-24 NOTE — ASSESSMENT
[FreeTextEntry1] : Mr. Murcia is a 61 year old male former smoker with a past medical history of CAD (s/p CABG x 3 in 2007), ESRD on HD, HTN, HLD and IDDM. He was seen urgently on 3/16/2022 due to patient not feeling well due to hypotensive episodes. He has required more frequent paracentesis that he and his wife have noted initially improved with coronary revascularization and that Dr. Al and Dr. Montgomery both feel are the cause of recurrent ascites. He is a dialysis patient. Seen in the ER on 3/11/2022 due to ascites s/p paracentesis by Dr. Thurman with 5 L removed. He was also found to have severe PAD of bilateral femoral arteries and he underwent a revascularization  for no healing ulcers of his heels and claudications symptoms. He reports dyspnea on exertion when walking although patient has bilateral ulcers on his heels and toes therefore he does not ambulate a lot. He is unable to lie flat in bed and is unable sleep. His appetite is poor and he reports making a small amount of urine daily. He is visibly weak today and requires assistance to pivot from wheelchair to exam table. He denies angina or palpitations. He feels dizzy when his blood pressure drops.  \par \par I have reviewed the patient's medical records, diagnostic images during the time of this office consultation and have made the following recommendations. Review of the imaging shows his aortic pathology has remained stable and does not require surgical intervention. \par \par Imaging results and medical records reviewed in detail with patient. Coronary artery disease with occluded right. The patient exhibits right sided heart failure (ascites) fixed pulmonary HTN and also is need of renal transplant. He will first require optimization from heart failure and I will ask Dr. Child to see patient for evaluation for lung transplant.  All questions were answered and concerns addressed. \par \par Plan:\par 1. Refer to Heart Failure (Dr. Cabrales)\par 2. Refer to Lung transplant team \par 3. Follow up with cardiologist and PCP\par \par

## 2022-04-06 ENCOUNTER — APPOINTMENT (OUTPATIENT)
Dept: HEART FAILURE | Facility: CLINIC | Age: 62
End: 2022-04-06
Payer: MEDICARE

## 2022-04-06 ENCOUNTER — APPOINTMENT (OUTPATIENT)
Dept: PULMONOLOGY | Facility: CLINIC | Age: 62
End: 2022-04-06
Payer: MEDICARE

## 2022-04-06 ENCOUNTER — APPOINTMENT (OUTPATIENT)
Dept: PULMONOLOGY | Facility: CLINIC | Age: 62
End: 2022-04-06

## 2022-04-06 VITALS
HEIGHT: 68 IN | BODY MASS INDEX: 20.72 KG/M2 | WEIGHT: 136.68 LBS | DIASTOLIC BLOOD PRESSURE: 51 MMHG | HEART RATE: 107 BPM | OXYGEN SATURATION: 98 % | TEMPERATURE: 98.4 F | SYSTOLIC BLOOD PRESSURE: 88 MMHG | RESPIRATION RATE: 16 BRPM

## 2022-04-06 DIAGNOSIS — Z86.69 PERSONAL HISTORY OF OTHER DISEASES OF THE NERVOUS SYSTEM AND SENSE ORGANS: ICD-10-CM

## 2022-04-06 DIAGNOSIS — Z01.818 ENCOUNTER FOR OTHER PREPROCEDURAL EXAMINATION: ICD-10-CM

## 2022-04-06 DIAGNOSIS — I73.9 PERIPHERAL VASCULAR DISEASE, UNSPECIFIED: ICD-10-CM

## 2022-04-06 DIAGNOSIS — I27.20 PULMONARY HYPERTENSION, UNSPECIFIED: ICD-10-CM

## 2022-04-06 DIAGNOSIS — I25.10 ATHEROSCLEROTIC HEART DISEASE OF NATIVE CORONARY ARTERY W/OUT ANGINA PECTORIS: ICD-10-CM

## 2022-04-06 DIAGNOSIS — Z82.49 FAMILY HISTORY OF ISCHEMIC HEART DISEASE AND OTHER DISEASES OF THE CIRCULATORY SYSTEM: ICD-10-CM

## 2022-04-06 DIAGNOSIS — Z87.01 PERSONAL HISTORY OF PNEUMONIA (RECURRENT): ICD-10-CM

## 2022-04-06 DIAGNOSIS — I50.22 CHRONIC SYSTOLIC (CONGESTIVE) HEART FAILURE: ICD-10-CM

## 2022-04-06 DIAGNOSIS — Z99.2 END STAGE RENAL DISEASE: ICD-10-CM

## 2022-04-06 DIAGNOSIS — Z86.39 PERSONAL HISTORY OF OTHER ENDOCRINE, NUTRITIONAL AND METABOLIC DISEASE: ICD-10-CM

## 2022-04-06 DIAGNOSIS — N18.6 END STAGE RENAL DISEASE: ICD-10-CM

## 2022-04-06 PROCEDURE — 99205 OFFICE O/P NEW HI 60 MIN: CPT

## 2022-04-06 PROCEDURE — 93000 ELECTROCARDIOGRAM COMPLETE: CPT

## 2022-04-06 RX ORDER — RIOCIGUAT 1 MG/1
1 TABLET, FILM COATED ORAL DAILY
Refills: 0 | Status: ACTIVE | COMMUNITY
Start: 2022-03-22

## 2022-04-06 RX ORDER — GABAPENTIN 300 MG/1
300 CAPSULE ORAL DAILY
Refills: 0 | Status: DISCONTINUED | COMMUNITY
Start: 2022-03-22 | End: 2022-04-06

## 2022-04-06 RX ORDER — CINACALCET HYDROCHLORIDE 30 MG/1
30 TABLET, COATED ORAL DAILY
Qty: 30 | Refills: 3 | Status: DISCONTINUED | COMMUNITY
Start: 2022-03-22 | End: 2022-04-06

## 2022-04-06 RX ORDER — SEVELAMER CARBONATE 800 MG/1
800 TABLET, FILM COATED ORAL EVERY 8 HOURS
Refills: 0 | Status: DISCONTINUED | COMMUNITY
Start: 2022-03-22 | End: 2022-04-06

## 2022-04-08 NOTE — ADDENDUM
[FreeTextEntry1] : Would like to admit and assess pulmonary hypertension by right heart catheterization and attempt to treat.  Possibility of kidney transplant has been brought up by the patient and his wife.  We discussed this in detail.  He is certainly high risk because of cardiopulmonary status as well as long standing diabetes and PVD as well as diabetic foot ulcers.  We can assess the aforementioned  in detail after the admission.  i am not entirely sure that he would like to move ahead but we are in fact offering admission for the work up.  Happy to try to help him out

## 2022-04-08 NOTE — PHYSICAL EXAM
[Normal Oropharynx] : normal oropharynx [Normal Appearance] : normal appearance [Normal Rate/Rhythm] : normal rate/rhythm [TextBox_2] : poorly nourished  [TextBox_68] : decreased breath sounds [TextBox_89] : abdomen distended  [TextBox_105] : b/l pitting edema R>L, lower extremiteis dressing in place

## 2022-04-08 NOTE — END OF VISIT
[FreeTextEntry3] : All medical record entries made by the Scribe were at the direction and personally dictated by the attending physician. I, the attending physician, have reviewed the chart and agree that the record accurately reflects my personal performance of the history, physical exam, assessment and plan. I have also personally directed, reviewed, and agreed with the chart.

## 2022-04-08 NOTE — ASSESSMENT
[FreeTextEntry1] : Mr. Murcia is a 60 y/o male with PMHX of pulm HTN, CAD,DM, PVD,  ESRD on dialysis (being worked up for Renal transplant), was evaluated today by Cardiology and our team\par - pt has extensive medical history, with care being provided at multiple different health centers\par - recommendation to admit pt for further evaluation and medical management, patient/wife wanted to think about this option and will reach out if decision is to be admitted\par - discussed case with Dr. Cabrales \par \par

## 2022-04-08 NOTE — HISTORY OF PRESENT ILLNESS
[TextBox_4] : Mr. Murcia is a 61 year old male former smoker with a past medical history of CAD (s/p CABG x 3 in ), multiple PCI’s, Pulm HTN based on Right heart cath done at [facility] with systolic pressure around 60.  ESRD on HD via left AVF Tues, Thursday, sat, HTN, HLD, IDDM  and BPH. Pt was in the process being evaluated for living donor kidney transplant. Of recent, he has had recurrent ascites with requiring more frequent paracentesis with the last one being on 3/11/2022, with removal of 5L.. He was also found to have severe PAD of bilateral femoral arteries and he underwent a revascularization for non healing ulcers of his heels and claudication symptoms. Dr. Sandoval requested a lung transplant evaluation on 2022, and additional clinical data is being gathered from the various medical centers that the patient has been cared for. Pt is scheduled to be evaluated by Heart Failure/Heart transplant team on 22 as well. \par \par s/p Right lower extremity stent yesterday and in Feb and S/p left lower extremity revascularization in 2022\par \par Being followed by  Dr. Granados from Cardiovascular surgery at [facility] \par phone number: [phone]\par \par Dr. Miguel Al (Dir, Interventional cardiology [facility] ) \par Office phone- [phone]\par \par pt/wife reports being told by above providers that pt is medically cleared for Renal transplant\par \par pt states he is not feeling better, admits that abdomed feels more full. received dialysis yesterday. \par does not use oxygen, saturation remains above 95% \par Mother and father are both \par mother had DM,\par siblings - no health issues\par \par \par Clinical data reviewed (mostly from Outside Hospital system)\par Diagnostic Right Heart Cath: 3/22/2021\par Findings: \par 1.	RIJ occluded, REG takes a tortous course\par 2.	With HR 67bpm, /77, Spo2 99%\par a.	RPA Saturation 56%\par b.	Right hepatic vein wedge= 20\par c.	Right hepatic vein= 19\par d.	mRAp=19\par e.	RV 63/19\par f.	RPCW= 28\par g.	RPA= 63/27 (41)\par h.	CO (thermodilution) 3.57 L/min\par i.	CI=1.97\par j.	PVR= 3.64\par 3.	With Nitroprusside 100 mcg/min\par a.	RPA saturation= 65%\par b.	RPCW=15\par c.	RPA=50/20 (28)\par d.	RV= 50/11\par e.	mRAp= 11\par f.	CO= 2.8\par g.	CI=1.55\par h.	PVR= 2.74\par Impression: even thought the CO by thermodilution appeared to decrease with nitroprusside, by Yris method, CO computed to 4.75L/min, CI 2.62L/min/m2 which is consistent with increased mixed venous saturation. \par \par Cardiac Cath :\par III vessel CAD (RCA, LAD LCx)\par patent LIMA to LAD mid-diastal anastomosis\par occluled SVG to LAD 1- Prox anastamosis\par occuled SVG to LCx to OM1- anastamosis\par in-stent restenosis of LAD (mid)\par unsuccessful intervention of proximal RCA and LCx branch LPDA\par \par Echo:22\par mild left artrial dialation\par mild left ventricular hypertrophy\par LV global wall motion is borderline normal\par LV EF is borderline normal (50%)\par LV diastolic dysfunction with elevated left atrial pressure. the left ventricular filling pattern is restrictive\par mild right atrial dilation, the right ventricle is moderatley dialted\par the aoritc root is normal in size (2.4cm) ascending aorta is normal in size; its diameter is 3.3 cm\par aortic valve is mildly calcified, no arotic regurg\par mild to moderate tricuspid regurg\par moderate pulmonic regurg\par right atrial pressure is 6-10 mmHg, moderate pulm htn\par \par Cardiac Catherization/Intervention at [facility] 2020\par 1.	Successful intervention of LCx- proximal (Atherectomy and DEBBIE-Synergy XD)\par 2.	Successful intervention of LCx- distal (PTCA and DEBBIE- [facility] [place])\par 3.	Successful intervention of LCx branch LPL1 (Atherectomy and DEBBIE-Syngergy XD)\par h/o of Low EF (20%), echo not available\par

## 2022-04-10 ENCOUNTER — NON-APPOINTMENT (OUTPATIENT)
Age: 62
End: 2022-04-10

## 2022-04-10 NOTE — DISCUSSION/SUMMARY
[FreeTextEntry1] : Mr. Murcia is a 60 y/o M with a history of ICM/HFrEF (LVEF now 48% which is improved from 20-25%), MI in 2007 and s/p CABG x 3 in 2013 at [facility] (patent LIMA-LAD, known  of SVG-D1 and SVG-OM1) with subsequent PCI to LCx 11/2020, PAH (on Adempas for the past year), ESRD on HD (T/Th/Sat) since 6/2019, s/p LUE AVF, recurrent ascites for the past year which needs to be drained about every 3 months (last paracentesis on 3/11/22 with 5L removed), HTN, HLD, IDDM, severe PAD of bilateral femoral arteries, s/p LLE angioplasty 1/2022 for non-healing ulcers of his heels and claudications symptoms, s/p RLE stent 2/2022 and 4/5/22, and former smoker (quit 2007), who presents today for an initial consultation of his HF prior to consideration of kidney transplant.\par \par He is currently severely volume overloaded with borderline BP and will likely not tolerate additional fluid removal through HD as an outpatient. Recommended he be admitted for optimization and eventual RHC. Patient would like to think about it and call once he plans on coming to the hospital. POC d/w lung transplant team as well who met with the patient today.

## 2022-04-10 NOTE — END OF VISIT
[Time Spent: ___ minutes] : I have spent [unfilled] minutes of time on the encounter. [>50% of the face to face encounter time was spent on counseling and/or coordination of care for ___] : Greater than 50% of the face to face encounter time was spent on counseling and/or coordination of care for [unfilled] [FreeTextEntry3] : I, Dr. Sherie Cabrales, personally performed the evaluation and management (E/M) services for this new patient.  That E/M includes conducting the initial examination, assessing all conditions, and establishing the plan of care.  Today, my ACP, Denisse Monet, was here to observe my evaluation and management services for this patient to be followed going forward.\par \par In summary, this is a 60 y/o male with EXTENSIVE pmh remarkable for HFrecEF from 20-25%, RV failure, Cpc pHTN pn adempas, RV failure, recurrent ascites requiring paracentesis, CAD s/p MI/CABG '13/PCIs '20, severe PAD.  eSRD on HD (via LUE AVF), hypotension on florinef, DLP, DM2, former smoker and chornic LE ulcrs/wounds who was referred for HF management and risk stratification for kidney tx. Mr. Murcia reports NYHA IIIb symptoms. Clinically he is in anasarca with JVD up to his jaw, ascites, and LE edema up to his thighs. Unfortunately, fluid removal via HD has been limited due to low BP. His SBP today remains low (88/51mmHg). \par I had a long discussion today with Mr and Mrs. Murcia. He has decompensated HF with high risk features including anasarca, NYHA IIIb symptoms, low BP and lukewarm extremities. It is likely that he is in low output state in setting of RV failure. I have recommended to admit him to the hospital for optimization and then reassessment of his pulmonary HTN and other comorbidities. \par I have explained that currently there are multiple concerning issues concerning for candidacy for kidney tx, these include uncontrolled pHTN, decompensated RV failure, PAD (need to assess severity) and active LE wounds/ulcers. \par Both the patient and his wife would like to think about it. They will also meet with Dr. Child today. \par Plan d/w Nilay Sandoval and Danyell.

## 2022-04-10 NOTE — CARDIOLOGY SUMMARY
[de-identified] : \par 11/10/21 TTE: LVEDD 5.7 cm, LVEF 48%, grade III diastolic dysfunction, RV not commented on, mild MR, mild-mod TR, PASP 46 mmHg.\par  [de-identified] : \par 03/22/21 RHC: HVP 19, HVWP 20, RA 19, RV 63/19, PA 63/27/41, PCWP 28, PA sat 56%, Thermo CO/CI 3.57/1.97, PVR 3.64 Khan. /77, HR 67.\par With nipride 100 mcg/min: RA 11, RV 50/11, PA 50/20/28, PCWP 15, PA sat 65%, Thermo CO/CI 2.8/1.55, Yris CO/CI 4.75/2.62, PVR 2.74. BP 94/55 (69), HR 67.\par \par 01/22/21 LHC: Unsuccessful intervention of pRCA and LCx branch of LPDA, patent LCx stents.\par \par 11/20/20 [facility]: LM normal, pLAD 30-50%, mLAD /ISR, D1 mod disease, pLCx 70-80% (s/p DEBBIE), OM1/2 mild disease, pRCA  with collaterals from LAD. Patent LIMA-LAD,  of SVG-D1,  of SVG-OM1. Ao 120/80/93.

## 2022-04-10 NOTE — PHYSICAL EXAM
[No Acute Distress] : no acute distress [Frail] : frail [No Xanthelasma] : no xanthelasma [Normal S1, S2] : normal S1, S2 [No Murmur] : no murmur [No Rub] : no rub [No Gallop] : no gallop [Soft] : abdomen soft [Non Tender] : non-tender [Moves all extremities] : moves all extremities [Normal Speech] : normal speech [Alert and Oriented] : alert and oriented [Normal memory] : normal memory [Ill-Appearing] : ill-appearing [de-identified] : JVP severely elevated [de-identified] : tachycardic [de-identified] : diminished at the bases, otherwise CTA [de-identified] : mildly distended, + ascites [de-identified] : pitting edema to thighs (R>L) [de-identified] : slow gait [de-identified] : aga peripherally

## 2022-04-10 NOTE — HISTORY OF PRESENT ILLNESS
[FreeTextEntry1] : Cardiologist: Dr. Joni Padilla ([facility])\par Evaluated by CTS Dr. Granados ([facility]) and Dr. Sandoval\par \par Mr. Murcia is a 62 y/o M with a history of ICM/HFrEF (LVEF now 48% which is improved from 20-25%), chronic systolic HF ACC/AHA stage C, CAD s/p MI in 2007 and s/p CABG x 3 in 2013 at [facility] (patent LIMA-LAD, known  of SVG-D1 and SVG-OM1) with subsequent PCI to LCx 11/2020, RV failure/PHTN (on Adempas for the past year), ESRD on HD (T/Th/Sat) since 6/2019, s/p LUE AVF, recurrent ascites for the past year which needs to be drained about every 3 months (last paracentesis on 3/11/22 with 5L removed), HTN, HLD, IDDM, severe PAD of bilateral femoral arteries, s/p LLE angioplasty 1/2022 for non-healing ulcers of his heels and claudications symptoms, s/p RLE stent 2/2022 and 4/5/22, and former smoker (quit 2007), who presents today for an initial consultation of his HF prior to consideration of kidney transplant.\par \par He currently endorses dyspnea on exertion, but also suffers from bilateral ulcers on his heels and toes for the past 3 months therefore his mobility has been limited (receives hyperbaric therapy). He has to sleep in the recliner due to orthopnea and endorses an intermittent dry cough. He attends HD T/Th/Sat and ever since starting Adempas a year ago his BP has been lower making it difficult for them to remove fluid. He was recently started on florinef for this. His weight is generally unchanged for him and his appetite is fair. He has chronic LE edema (R>L). He denies CP/palpitations. He endorses occasional lightheadedness but denies any syncope or falls. He has been limiting fluid and sodium in his diet and taking his medications as directed. He has not been hospitalized since 11/2020 when he had his last PCI.\par \par He si currently seeing multiple specialists at [facility], [facility] and now at [facility]. He is very eager to get a kidney transplant, he refers he has identified a living donor.

## 2022-04-10 NOTE — REASON FOR VISIT
[Cardiac Failure] : cardiac failure [Spouse] : spouse [FreeTextEntry1] : \par CTS: Santiago Sandoval MD\par \par Pulm Tx: López Child MD [FreeTextEntry3] : Dr. Santiago Sandoval and Dr. López Child

## 2022-04-10 NOTE — ASSESSMENT
[FreeTextEntry1] : # HF with improved EF, ACC/AHA Stage C-D, NYHA IIIb \par - ICMP LVEF 48% LVIDd 5.7cm  improved from 25% pre CABG \par - now with primarily RV failure in the setting of pulmonary hypertension\par - cliniclally in anasarca with lukewarm extremities. Due to borderline BP would likely not tolerate additional HD sessions as an outpatient\par - recommended he be admitted for fluid removal which will likely need to be with CVVHD +/- pressors. Pt and his wife will think about it. \par - Unclear indication for Lasix 80 mg BID as he does not make much urine on his own\par - HF education provided including lifestyle changes (low Na diet, fluid restriction, increase physical activity), current clinical condition, natural progression and prognosis.\par \par \par # Pulmonary hypertension:\par - Unclear etiology and why adempas was started\par - He is an exsmoker \par - Last RHC was 1 y ago 3/2021. Remarkable for pre and post capillary pHTN (PA 63/27/41 PCWP 28 TD CO/CI 3.57/1.97 PVR 3.64 benavidez\par - On Adempas 1.5 mg TID. Dose recently decreased due to low BP\par - Needs repeat RHC including pHTN work up. Of note last rHC showed low CO/CI even in setting of A-V fistula. \par -will meet with lung transplant team today\par \par # ESRD:\par - receives HD T/Th/Sat, but recommended he be admitted for CVVHD as above given borderline BP \par - patient is interested in having a living donor kidney transplant (previously evaluated at [facility] for kidney transplant)\par - Current concerns for kidney tx include: uncontrolled pHTN, decompensated RV failure, PAD (need to assess severity) and active LE wounds/ulcers. \par \par # CAD:\par - s/p CABG x 3 in 2013 at [facility] (patent LIMA-LAD, known  of SVG-D1 and SVG-OM1) with subsequent PCI to LCx 11/2020\par - c/w ASA/Plavix and statin\par \par # PAD:\par - followed by Dr. Duque\par - c/w ASA, Plavix, and Xarelto?- on this for his PAD per patients spouse\par - c/w hyperbaric therapy for treatment of his foot ulcers

## 2022-04-25 ENCOUNTER — RESULT REVIEW (OUTPATIENT)
Age: 62
End: 2022-04-25

## 2023-09-29 NOTE — DISCHARGE NOTE ADULT - CAREGIVER NAME
----- Message from Georgia Mckee sent at 9/29/2023 12:59 PM EDT -----  Regarding: needs f/u appt after imaging  Please call pt to sched f/u appt after his CT on 11/14 and before his surgery on 12/11. Thanks!       ANSHUL AHUJA